# Patient Record
Sex: MALE | Race: WHITE | NOT HISPANIC OR LATINO | URBAN - METROPOLITAN AREA
[De-identification: names, ages, dates, MRNs, and addresses within clinical notes are randomized per-mention and may not be internally consistent; named-entity substitution may affect disease eponyms.]

---

## 2019-10-15 ENCOUNTER — EMERGENCY (EMERGENCY)
Facility: HOSPITAL | Age: 44
LOS: 1 days | Discharge: HOME | End: 2019-10-15
Attending: EMERGENCY MEDICINE | Admitting: EMERGENCY MEDICINE
Payer: COMMERCIAL

## 2019-10-15 VITALS
HEIGHT: 75 IN | HEART RATE: 71 BPM | DIASTOLIC BLOOD PRESSURE: 100 MMHG | WEIGHT: 272.05 LBS | OXYGEN SATURATION: 97 % | SYSTOLIC BLOOD PRESSURE: 157 MMHG | RESPIRATION RATE: 18 BRPM | TEMPERATURE: 98 F

## 2019-10-15 VITALS
SYSTOLIC BLOOD PRESSURE: 141 MMHG | HEART RATE: 92 BPM | DIASTOLIC BLOOD PRESSURE: 96 MMHG | OXYGEN SATURATION: 97 % | RESPIRATION RATE: 16 BRPM | TEMPERATURE: 98 F

## 2019-10-15 PROCEDURE — 71045 X-RAY EXAM CHEST 1 VIEW: CPT | Mod: 26

## 2019-10-15 PROCEDURE — 99053 MED SERV 10PM-8AM 24 HR FAC: CPT

## 2019-10-15 PROCEDURE — 99283 EMERGENCY DEPT VISIT LOW MDM: CPT

## 2019-10-15 PROCEDURE — 73030 X-RAY EXAM OF SHOULDER: CPT

## 2019-10-15 PROCEDURE — 99284 EMERGENCY DEPT VISIT MOD MDM: CPT

## 2019-10-15 PROCEDURE — 71045 X-RAY EXAM CHEST 1 VIEW: CPT

## 2019-10-15 PROCEDURE — 73030 X-RAY EXAM OF SHOULDER: CPT | Mod: 26,RT

## 2019-10-15 RX ORDER — IBUPROFEN 200 MG
600 TABLET ORAL ONCE
Refills: 0 | Status: COMPLETED | OUTPATIENT
Start: 2019-10-15 | End: 2019-10-15

## 2019-10-15 RX ORDER — ACETAMINOPHEN 500 MG
650 TABLET ORAL ONCE
Refills: 0 | Status: COMPLETED | OUTPATIENT
Start: 2019-10-15 | End: 2019-10-15

## 2019-10-15 RX ADMIN — Medication 600 MILLIGRAM(S): at 03:17

## 2019-10-15 RX ADMIN — Medication 600 MILLIGRAM(S): at 02:51
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
## 2019-10-15 NOTE — ED ADULT NURSE REASSESSMENT NOTE - NS ED NURSE REASSESS COMMENT FT1
Pt /96. Pt reports he had his annual physical with PCP this AM and was started on valsartan for BP. Took first dose today. Pt denies headache, blurry vision, N/V. Awaiting disposition.
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
## 2019-10-15 NOTE — ED PROVIDER NOTE - NSFOLLOWUPINSTRUCTIONS_ED_ALL_ED_FT
Rest, drink plenty of fluids  Advance activity as tolerated  Continue all previously prescribed medications as directed  Follow up with your PMD 2-3 days - bring copies of your results  Return to the ER for worsening pain, weakness, loss of sensation or other new or concerning symptoms.

## 2019-10-15 NOTE — ED PROVIDER NOTE - CLINICAL SUMMARY MEDICAL DECISION MAKING FREE TEXT BOX
49yo male presenting with shoulder pain.  Patient has full neurovascular function of extremity.  Low concern for fracture but point tenderness at acromioclavicular joint requires imaging.  Will get pain control and likely dc.

## 2019-10-15 NOTE — ED PROVIDER NOTE - PATIENT PORTAL LINK FT
You can access the FollowMyHealth Patient Portal offered by [facility] by registering at the following website: [email or web address]. By joining [employer]’s FollowMyHealth portal, you will also be able to view your health information using other applications (apps) compatible with our system.

## 2019-10-15 NOTE — ED ADULT NURSE NOTE - CHPI ED NUR SYMPTOMS NEG
no difficulty bearing weight/no deformity/no bruising/no weakness/no abrasion/no fever/no numbness/no tingling

## 2019-10-15 NOTE — ED PROVIDER NOTE - PHYSICAL EXAMINATION
General appearance: NAD, conversant, afebrile    Eyes: anicteric sclerae   Neck: Trachea midline; Full range of motion, supple, not hypertonic   Pulm: CTA bl, normal respiratory effort and no intercostal retractions, normal work of breathing   CV: RRR, No murmurs, rubs, or gallops   Extremities: No obvious deformities, point tenderness at right acromioclavicular joint, no clavicular step offs or sternoclavicular tenderness, no peripheral edema, gross sensation intact, 5/5 strength in all four extremities   Skin: Dry, normal temperature, turgor and texture;   Psych: Appropriate affect, cooperative; alert and oriented to person, place and time

## 2019-10-29 ENCOUNTER — TRANSCRIPTION ENCOUNTER (OUTPATIENT)
Age: 44
End: 2019-10-29
